# Patient Record
Sex: MALE | Race: WHITE | NOT HISPANIC OR LATINO | Employment: FULL TIME | URBAN - METROPOLITAN AREA
[De-identification: names, ages, dates, MRNs, and addresses within clinical notes are randomized per-mention and may not be internally consistent; named-entity substitution may affect disease eponyms.]

---

## 2018-05-05 ENCOUNTER — HOSPITAL ENCOUNTER (EMERGENCY)
Facility: HOSPITAL | Age: 63
Discharge: HOME | End: 2018-05-05
Attending: EMERGENCY MEDICINE
Payer: COMMERCIAL

## 2018-05-05 VITALS
HEIGHT: 70 IN | TEMPERATURE: 98 F | BODY MASS INDEX: 37.94 KG/M2 | WEIGHT: 265 LBS | RESPIRATION RATE: 24 BRPM | SYSTOLIC BLOOD PRESSURE: 140 MMHG | HEART RATE: 74 BPM | OXYGEN SATURATION: 95 % | DIASTOLIC BLOOD PRESSURE: 74 MMHG

## 2018-05-05 DIAGNOSIS — R06.00 DYSPNEA: ICD-10-CM

## 2018-05-05 DIAGNOSIS — R06.02 SOB (SHORTNESS OF BREATH): ICD-10-CM

## 2018-05-05 DIAGNOSIS — J45.901 ASTHMATIC BRONCHITIS WITH ACUTE EXACERBATION, UNSPECIFIED ASTHMA SEVERITY, UNSPECIFIED WHETHER PERSISTENT: Primary | ICD-10-CM

## 2018-05-05 LAB
ALBUMIN SERPL BCP-MCNC: 3.8 G/DL
ALP SERPL-CCNC: 38 U/L
ALT SERPL W/O P-5'-P-CCNC: 18 U/L
ANION GAP SERPL CALC-SCNC: 7 MMOL/L
AST SERPL-CCNC: 20 U/L
BASOPHILS # BLD AUTO: 0.04 K/UL
BASOPHILS NFR BLD: 0.4 %
BILIRUB SERPL-MCNC: 0.5 MG/DL
BNP SERPL-MCNC: 45 PG/ML
BUN SERPL-MCNC: 27 MG/DL
CALCIUM SERPL-MCNC: 9 MG/DL
CHLORIDE SERPL-SCNC: 109 MMOL/L
CO2 SERPL-SCNC: 23 MMOL/L
CREAT SERPL-MCNC: 1.2 MG/DL
D DIMER PPP IA.FEU-MCNC: 0.52 MG/L FEU
DIFFERENTIAL METHOD: ABNORMAL
EOSINOPHIL # BLD AUTO: 0.7 K/UL
EOSINOPHIL NFR BLD: 6.9 %
ERYTHROCYTE [DISTWIDTH] IN BLOOD BY AUTOMATED COUNT: 13.9 %
EST. GFR  (AFRICAN AMERICAN): >60 ML/MIN/1.73 M^2
EST. GFR  (NON AFRICAN AMERICAN): >60 ML/MIN/1.73 M^2
GLUCOSE SERPL-MCNC: 100 MG/DL
HCT VFR BLD AUTO: 44.1 %
HGB BLD-MCNC: 14.6 G/DL
LYMPHOCYTES # BLD AUTO: 1.5 K/UL
LYMPHOCYTES NFR BLD: 15.1 %
MCH RBC QN AUTO: 28.9 PG
MCHC RBC AUTO-ENTMCNC: 33.1 G/DL
MCV RBC AUTO: 87 FL
MONOCYTES # BLD AUTO: 1.3 K/UL
MONOCYTES NFR BLD: 12.9 %
NEUTROPHILS # BLD AUTO: 6.2 K/UL
NEUTROPHILS NFR BLD: 64.6 %
PLATELET # BLD AUTO: 225 K/UL
PMV BLD AUTO: 10.4 FL
POTASSIUM SERPL-SCNC: 4 MMOL/L
PROT SERPL-MCNC: 7.1 G/DL
RBC # BLD AUTO: 5.06 M/UL
SODIUM SERPL-SCNC: 139 MMOL/L
TROPONIN I SERPL DL<=0.01 NG/ML-MCNC: 0.01 NG/ML
WBC # BLD AUTO: 9.66 K/UL

## 2018-05-05 PROCEDURE — 94644 CONT INHLJ TX 1ST HOUR: CPT

## 2018-05-05 PROCEDURE — 80053 COMPREHEN METABOLIC PANEL: CPT

## 2018-05-05 PROCEDURE — 63600175 PHARM REV CODE 636 W HCPCS: Performed by: EMERGENCY MEDICINE

## 2018-05-05 PROCEDURE — 25000242 PHARM REV CODE 250 ALT 637 W/ HCPCS: Performed by: NURSE PRACTITIONER

## 2018-05-05 PROCEDURE — 93005 ELECTROCARDIOGRAM TRACING: CPT

## 2018-05-05 PROCEDURE — 84484 ASSAY OF TROPONIN QUANT: CPT

## 2018-05-05 PROCEDURE — 99285 EMERGENCY DEPT VISIT HI MDM: CPT | Mod: 25

## 2018-05-05 PROCEDURE — 93010 ELECTROCARDIOGRAM REPORT: CPT | Mod: ,,, | Performed by: INTERNAL MEDICINE

## 2018-05-05 PROCEDURE — 25500020 PHARM REV CODE 255: Performed by: EMERGENCY MEDICINE

## 2018-05-05 PROCEDURE — 83880 ASSAY OF NATRIURETIC PEPTIDE: CPT

## 2018-05-05 PROCEDURE — 85025 COMPLETE CBC W/AUTO DIFF WBC: CPT

## 2018-05-05 PROCEDURE — 85379 FIBRIN DEGRADATION QUANT: CPT

## 2018-05-05 RX ORDER — AZITHROMYCIN 250 MG/1
500 TABLET, FILM COATED ORAL DAILY
Qty: 6 TABLET | Refills: 0 | Status: SHIPPED | OUTPATIENT
Start: 2018-05-05

## 2018-05-05 RX ORDER — PREDNISONE 20 MG/1
40 TABLET ORAL
Status: COMPLETED | OUTPATIENT
Start: 2018-05-05 | End: 2018-05-05

## 2018-05-05 RX ORDER — CAPTOPRIL 100 MG/1
25 TABLET ORAL 2 TIMES DAILY
COMMUNITY

## 2018-05-05 RX ORDER — PREDNISONE 20 MG/1
40 TABLET ORAL DAILY
Qty: 10 TABLET | Refills: 0 | Status: SHIPPED | OUTPATIENT
Start: 2018-05-05 | End: 2018-05-10

## 2018-05-05 RX ORDER — ALBUTEROL SULFATE 2.5 MG/.5ML
15 SOLUTION RESPIRATORY (INHALATION)
Status: COMPLETED | OUTPATIENT
Start: 2018-05-05 | End: 2018-05-05

## 2018-05-05 RX ORDER — ALBUTEROL SULFATE 90 UG/1
1-2 AEROSOL, METERED RESPIRATORY (INHALATION) EVERY 6 HOURS PRN
Qty: 1 INHALER | Refills: 0 | Status: SHIPPED | OUTPATIENT
Start: 2018-05-05 | End: 2019-05-05

## 2018-05-05 RX ADMIN — PREDNISONE 40 MG: 20 TABLET ORAL at 12:05

## 2018-05-05 RX ADMIN — IOHEXOL 70 ML: 350 INJECTION, SOLUTION INTRAVENOUS at 11:05

## 2018-05-05 RX ADMIN — ALBUTEROL SULFATE 15 MG: 2.5 SOLUTION RESPIRATORY (INHALATION) at 10:05

## 2018-05-05 NOTE — ED TRIAGE NOTES
Pt arrived via personal transport with c/o sob and cough that began yesterday; pt denies cp, n/v at this time; pt states this has happened to him before and was given a breathing treatment

## 2018-05-05 NOTE — ED PROVIDER NOTES
Encounter Date: 5/5/2018  SOB since coughing episode this morning.  Patient in respiratory distress.  Occasional wheeze with breath sound.       NMT  CXR  ECG  SCRIBE #1 NOTE: I, Ankit Felder, am scribing for, and in the presence of, Joe Diaz MD. Other sections scribed: HPI, ROS, PE, MDM.       History     Chief Complaint   Patient presents with    Shortness of Breath     x 1 day. PMH of HTN. Denies chest pain     CC: Shortness of Breath  HPI: This 62 y.o. male smoker with Hx of HTN and chronic back pain presents to the ED c/o cough that developed yesterday afternoon that has since gradually worsened. Pt reports feeling SOB today. Pt explains that he is [place] the [place] from [place], and has spent the past few months [place] He uses a walker. He denies fever, chest pain, hemoptysis.      The history is provided by the patient.     Review of patient's allergies indicates:  No Known Allergies  Past Medical History:   Diagnosis Date    Hypertension      Past Surgical History:   Procedure Laterality Date    kidney stones      KNEE SURGERY       History reviewed. No pertinent family history.  Social History   Substance Use Topics    Smoking status: Current Every Day Smoker     Types: Cigarettes    Smokeless tobacco: Not on file    Alcohol use No     Review of Systems   Constitutional: Negative for chills and fever.   HENT: Negative for ear pain, rhinorrhea and sore throat.    Eyes: Negative for pain and visual disturbance.   Respiratory: Positive for cough, shortness of breath and wheezing.    Cardiovascular: Negative for chest pain and leg swelling.   Gastrointestinal: Negative for abdominal pain, nausea and vomiting.   Genitourinary: Negative for dysuria and frequency.   Musculoskeletal: Negative for arthralgias and myalgias.   Skin: Negative for rash.   Neurological: Negative for dizziness and headaches.       Physical Exam     Initial Vitals [05/05/18  0924]   BP Pulse Resp Temp SpO2   (!) 184/99 77 (!) 26 98.1 °F (36.7 °C) 95 %      MAP       127.33         Physical Exam    Nursing note and vitals reviewed.  Constitutional: He appears well-developed and well-nourished.   HENT:   Head: Normocephalic and atraumatic.   Cardiovascular: Normal rate and regular rhythm.   Equal radial, femoral and DP pulses.   Pulmonary/Chest: No respiratory distress. He has wheezes. He has no rhonchi. He has no rales. He exhibits no tenderness.   Moderately SOB w/ diffuse expiratory wheezes   Abdominal: Soft. There is no tenderness.   Musculoskeletal: Normal range of motion. He exhibits no tenderness.   Trace edema to bilateral lower extremities. No sign of DVT.   Neurological: He is alert.   Skin: Skin is warm.   Psychiatric: Thought content normal.         ED Course   Procedures  Labs Reviewed   CBC W/ AUTO DIFFERENTIAL - Abnormal; Notable for the following:        Result Value    Mono # 1.3 (*)     Eos # 0.7 (*)     Lymph% 15.1 (*)     All other components within normal limits   COMPREHENSIVE METABOLIC PANEL - Abnormal; Notable for the following:     BUN, Bld 27 (*)     Alkaline Phosphatase 38 (*)     Anion Gap 7 (*)     All other components within normal limits   D DIMER, QUANTITATIVE - Abnormal; Notable for the following:     D-Dimer 0.52 (*)     All other components within normal limits   TROPONIN I   B-TYPE NATRIURETIC PEPTIDE     EKG Readings: (Independently Interpreted)   NSR at85 with artifact   No acute ischemic changes          Medical Decision Making:   Initial Assessment:   Pt presents with wheezing and SOB. Will do cardiac work-up and give nebulizer treatment. Will rule PE due to significant travel Hx.  ED Management:  10:56 AM  Wheezing improved with nebs and breathing comfortably    12:27 PM  Pt responded well to treatment with nebs.  Doing well off oxygen and feeling back to normal except mild cough.  Reviewed studies.  Doubt subtle PE.  Has alternative diagnosis  confirmed with peribronchial cuiffing.  Will dischagre home on MDI, prednisone, zpak.  Doubt ACS - will not repeat a troponin.            Scribe Attestation:   Scribe #1: I performed the above scribed service and the documentation accurately describes the services I performed. I attest to the accuracy of the note.    Attending Attestation:           Physician Attestation for Scribe:  Physician Attestation Statement for Scribe #1: I, Joe Diaz MD, reviewed documentation, as scribed by Ankit Felder in my presence, and it is both accurate and complete.                    Clinical Impression:   The primary encounter diagnosis was Asthmatic bronchitis with acute exacerbation, unspecified asthma severity, unspecified whether persistent. Diagnoses of SOB (shortness of breath) and Dyspnea were also pertinent to this visit.    Disposition:   Disposition: Discharged  Condition: Stable                        Joe Diaz MD  05/05/18 9525

## 2019-04-26 ENCOUNTER — HOSPITAL ENCOUNTER (EMERGENCY)
Facility: HOSPITAL | Age: 64
Discharge: HOME | End: 2019-04-26
Attending: EMERGENCY MEDICINE
Payer: COMMERCIAL

## 2019-04-26 VITALS
SYSTOLIC BLOOD PRESSURE: 180 MMHG | DIASTOLIC BLOOD PRESSURE: 98 MMHG | HEART RATE: 60 BPM | WEIGHT: 270 LBS | TEMPERATURE: 98 F | OXYGEN SATURATION: 99 % | HEIGHT: 70 IN | BODY MASS INDEX: 38.65 KG/M2 | RESPIRATION RATE: 18 BRPM

## 2019-04-26 DIAGNOSIS — M54.6 ACUTE RIGHT-SIDED THORACIC BACK PAIN: Primary | ICD-10-CM

## 2019-04-26 DIAGNOSIS — R07.9 RIGHT-SIDED CHEST PAIN: ICD-10-CM

## 2019-04-26 DIAGNOSIS — R60.9 EDEMA: ICD-10-CM

## 2019-04-26 LAB
ALBUMIN SERPL BCP-MCNC: 3.5 G/DL (ref 3.5–5.2)
ALP SERPL-CCNC: 34 U/L (ref 55–135)
ALT SERPL W/O P-5'-P-CCNC: 10 U/L (ref 10–44)
ANION GAP SERPL CALC-SCNC: 7 MMOL/L (ref 8–16)
AST SERPL-CCNC: 15 U/L (ref 10–40)
BASOPHILS # BLD AUTO: 0.07 K/UL (ref 0–0.2)
BASOPHILS NFR BLD: 0.7 % (ref 0–1.9)
BILIRUB SERPL-MCNC: 0.2 MG/DL (ref 0.1–1)
BILIRUB UR QL STRIP: NEGATIVE
BUN SERPL-MCNC: 23 MG/DL (ref 8–23)
CALCIUM SERPL-MCNC: 9.3 MG/DL (ref 8.7–10.5)
CHLORIDE SERPL-SCNC: 109 MMOL/L (ref 95–110)
CLARITY UR: CLEAR
CO2 SERPL-SCNC: 22 MMOL/L (ref 23–29)
COLOR UR: YELLOW
CREAT SERPL-MCNC: 1.2 MG/DL (ref 0.5–1.4)
D DIMER PPP IA.FEU-MCNC: 0.52 MG/L FEU
DIFFERENTIAL METHOD: ABNORMAL
EOSINOPHIL # BLD AUTO: 0.7 K/UL (ref 0–0.5)
EOSINOPHIL NFR BLD: 6.7 % (ref 0–8)
ERYTHROCYTE [DISTWIDTH] IN BLOOD BY AUTOMATED COUNT: 13.4 % (ref 11.5–14.5)
EST. GFR  (AFRICAN AMERICAN): >60 ML/MIN/1.73 M^2
EST. GFR  (NON AFRICAN AMERICAN): >60 ML/MIN/1.73 M^2
GLUCOSE SERPL-MCNC: 106 MG/DL (ref 70–110)
GLUCOSE UR QL STRIP: NEGATIVE
HCT VFR BLD AUTO: 42.7 % (ref 40–54)
HGB BLD-MCNC: 13.5 G/DL (ref 14–18)
HGB UR QL STRIP: NEGATIVE
KETONES UR QL STRIP: NEGATIVE
LEUKOCYTE ESTERASE UR QL STRIP: NEGATIVE
LIPASE SERPL-CCNC: 12 U/L (ref 4–60)
LYMPHOCYTES # BLD AUTO: 3.3 K/UL (ref 1–4.8)
LYMPHOCYTES NFR BLD: 34.3 % (ref 18–48)
MCH RBC QN AUTO: 28.8 PG (ref 27–31)
MCHC RBC AUTO-ENTMCNC: 31.6 G/DL (ref 32–36)
MCV RBC AUTO: 91 FL (ref 82–98)
MICROSCOPIC COMMENT: NORMAL
MONOCYTES # BLD AUTO: 0.8 K/UL (ref 0.3–1)
MONOCYTES NFR BLD: 8.7 % (ref 4–15)
NEUTROPHILS # BLD AUTO: 4.8 K/UL (ref 1.8–7.7)
NEUTROPHILS NFR BLD: 49.6 % (ref 38–73)
NITRITE UR QL STRIP: NEGATIVE
PH UR STRIP: 5 [PH] (ref 5–8)
PLATELET # BLD AUTO: 247 K/UL (ref 150–350)
PMV BLD AUTO: 10 FL (ref 9.2–12.9)
POTASSIUM SERPL-SCNC: 4.2 MMOL/L (ref 3.5–5.1)
PROT SERPL-MCNC: 6.8 G/DL (ref 6–8.4)
PROT UR QL STRIP: NEGATIVE
RBC # BLD AUTO: 4.69 M/UL (ref 4.6–6.2)
RBC #/AREA URNS HPF: 1 /HPF (ref 0–4)
SODIUM SERPL-SCNC: 138 MMOL/L (ref 136–145)
SP GR UR STRIP: 1.02 (ref 1–1.03)
URN SPEC COLLECT METH UR: NORMAL
UROBILINOGEN UR STRIP-ACNC: NEGATIVE EU/DL
WBC # BLD AUTO: 9.67 K/UL (ref 3.9–12.7)

## 2019-04-26 PROCEDURE — 63600175 PHARM REV CODE 636 W HCPCS: Performed by: EMERGENCY MEDICINE

## 2019-04-26 PROCEDURE — 96372 THER/PROPH/DIAG INJ SC/IM: CPT

## 2019-04-26 PROCEDURE — 93010 ELECTROCARDIOGRAM REPORT: CPT | Mod: ,,, | Performed by: INTERNAL MEDICINE

## 2019-04-26 PROCEDURE — 85379 FIBRIN DEGRADATION QUANT: CPT

## 2019-04-26 PROCEDURE — 83690 ASSAY OF LIPASE: CPT

## 2019-04-26 PROCEDURE — 93005 ELECTROCARDIOGRAM TRACING: CPT

## 2019-04-26 PROCEDURE — 80053 COMPREHEN METABOLIC PANEL: CPT

## 2019-04-26 PROCEDURE — 93010 EKG 12-LEAD: ICD-10-PCS | Mod: ,,, | Performed by: INTERNAL MEDICINE

## 2019-04-26 PROCEDURE — 85025 COMPLETE CBC W/AUTO DIFF WBC: CPT

## 2019-04-26 PROCEDURE — 99284 EMERGENCY DEPT VISIT MOD MDM: CPT | Mod: 25

## 2019-04-26 PROCEDURE — 81000 URINALYSIS NONAUTO W/SCOPE: CPT

## 2019-04-26 RX ORDER — PERINDOPRIL ERBUMINE 4 MG/1
4 TABLET ORAL DAILY
COMMUNITY

## 2019-04-26 RX ORDER — DEXAMETHASONE SODIUM PHOSPHATE 4 MG/ML
12 INJECTION, SOLUTION INTRA-ARTICULAR; INTRALESIONAL; INTRAMUSCULAR; INTRAVENOUS; SOFT TISSUE
Status: COMPLETED | OUTPATIENT
Start: 2019-04-26 | End: 2019-04-26

## 2019-04-26 RX ORDER — PREDNISONE 20 MG/1
60 TABLET ORAL DAILY
Qty: 15 TABLET | Refills: 0 | Status: SHIPPED | OUTPATIENT
Start: 2019-04-26 | End: 2019-05-01

## 2019-04-26 RX ADMIN — DEXAMETHASONE SODIUM PHOSPHATE 12 MG: 4 INJECTION, SOLUTION INTRA-ARTICULAR; INTRALESIONAL; INTRAMUSCULAR; INTRAVENOUS; SOFT TISSUE at 07:04

## 2019-04-26 NOTE — ED PROVIDER NOTES
Encounter Date: 4/26/2019       History     Chief Complaint   Patient presents with    Abdominal Pain     Pt c/o right sided abdominal pain radiating to back on and off for about a month. Pt reports living in [place] and was seen in ED there with no diagnosis. Pt reports the pain has been increasing the last few days. Denies nausea or vomting.      HPI   This 63-year-old male presents to the emergency room complaining of some right low thoracic back pain that radiates around to the right low anterior chest off and on for about a month.  The patient has been studied in [place].  He had CTs there that were negative.  Pulmonary embolus is been considered.  The patient is at [facility] for [facility] emboli in the past.  This evaluation is been negative.  The patient denies nausea vomiting or diarrhea.  There is no painful urination dark urine blood in the stool.  The patient has no history of trauma he is otherwise well. His pain is definitely only on the right side.  Review of patient's allergies indicates:  No Known Allergies  Past Medical History:   Diagnosis Date    Hypertension      Past Surgical History:   Procedure Laterality Date    kidney stones      KNEE SURGERY       No family history on file.  Social History     Tobacco Use    Smoking status: Current Every Day Smoker     Types: Cigarettes   Substance Use Topics    Alcohol use: No    Drug use: No     Review of Systems  The patient was questioned specifically with regard to the following.  General: Fever, chills, sweats. Neuro: Headache. Eyes: eye problems. ENT: Ear pain, sore throat. Cardiovascular: Chest pain. Respiratory: Cough, shortness of breath. Gastrointestinal: Abdominal pain, vomiting, diarrhea. Genitourinary: Painful urination.  Musculoskeletal: Arm and leg problems. Skin: Rash.  The review of systems was negative except for the following:  Right low thoracic back, right anterior chest pain.  There is no real abdominal pain. There is no nausea  vomiting.  Physical Exam     Initial Vitals [04/26/19 0406]   BP Pulse Resp Temp SpO2   (!) 216/122 66 18 97.7 °F (36.5 °C) 98 %      MAP       --         Physical Exam  The patient was examined specifically for the following:   General:No significant distress, Good color, Warm and dry. Head and neck:Scalp atraumatic, Neck supple. Neurological:Appropriate conversation, Gross motor deficits. Eyes:Conjugate gaze, Clear corneas. ENT: No epistaxis. Cardiac: Regular rate and rhythm, Grossly normal heart tones. Pulmonary: Wheezing, Rales. Gastrointestinal: Abdominal tenderness, Abdominal distention. Musculoskeletal: Extremity deformity, Apparent pain with range of motion of the joints. Skin: Rash.   The findings on examination were normal.  The lungs are clear.  The heart tones are normal.  The abdomen is soft.  There is no chest tenderness thoracic back tenderness midline back tenderness. The abdomen is completely nontender.  There is no costovertebral angle tenderness.  ED Course   Procedures  Labs Reviewed   CBC W/ AUTO DIFFERENTIAL - Abnormal; Notable for the following components:       Result Value    Hemoglobin 13.5 (*)     MCHC 31.6 (*)     Eos # 0.7 (*)     All other components within normal limits   COMPREHENSIVE METABOLIC PANEL - Abnormal; Notable for the following components:    CO2 22 (*)     Alkaline Phosphatase 34 (*)     Anion Gap 7 (*)     All other components within normal limits   D DIMER, QUANTITATIVE - Abnormal; Notable for the following components:    D-Dimer 0.52 (*)     All other components within normal limits   LIPASE   URINALYSIS, REFLEX TO URINE CULTURE    Narrative:     Preferred Collection Type->Urine, Clean Catch   URINALYSIS MICROSCOPIC    Narrative:     Preferred Collection Type->Urine, Clean Catch        ECG Results          EKG 12-lead (In process)  Result time 04/26/19 05:56:46    In process by Interface, Lab In [facility] (04/26/19 05:56:46)                 Narrative:    Test Reason :  R60.9,    Vent. Rate : 058 BPM     Atrial Rate : 058 BPM     P-R Int : 224 ms          QRS Dur : 130 ms      QT Int : 408 ms       P-R-T Axes : 072 -78 049 degrees     QTc Int : 400 ms    Sinus bradycardia with 1st degree A-V block  Left axis deviation  Nonspecific intraventricular block  Abnormal ECG  When compared with ECG of 05-MAY-2018 09:53,  Significant changes have occurred    Referred By: [email or web address]   SELF           Confirmed By:                   In process by Interface, Lab In [facility] (04/26/19 05:54:06)                 Narrative:    Test Reason : R60.9,    Vent. Rate : 058 BPM     Atrial Rate : 058 BPM     P-R Int : 224 ms          QRS Dur : 130 ms      QT Int : 408 ms       P-R-T Axes : 072 -78 049 degrees     QTc Int : 400 ms    Sinus bradycardia with 1st degree A-V block  Left axis deviation  Nonspecific intraventricular block  Abnormal ECG  When compared with ECG of 05-MAY-2018 09:53,  Significant changes have occurred    Referred By: [email or web address]   SELF           Confirmed By:                             Imaging Results          CT Renal Stone Study ABD Pelvis WO (Final result)  Result time 04/26/19 07:05:22    Final result by Ar Lange MD (04/26/19 07:05:22)                 Impression:      1.  Bilateral nonobstructing renal calculi without evidence of hydronephrosis.  No definite ureteral calculus identified.    2.  Hepatomegaly with stable appendix cyst.    3.  Bilateral low-attenuation adrenal nodules suggestive of adrenal adenomas.    4.  Right renal cyst.    5.  Diverticulosis without evidence of acute diverticulitis.      Electronically signed by: Ar Lange MD  Date:    04/26/2019  Time:    07:05             Narrative:    EXAMINATION:  CT RENAL STONE STUDY ABD PELVIS WO    CLINICAL HISTORY:  Flank pain, stone disease suspected;    TECHNIQUE:  Low dose axial images, sagittal and coronal reformations were obtained from the lung bases to the pubic symphysis.  Contrast was not  administered.    COMPARISON:  None    FINDINGS:  The lung bases are unremarkable.  There is no pleural fluid present.  There are coronary artery calcifications.  No significant pericardial effusion.    The liver is mildly enlarged.  There is a stable 2.9 cm well-circumscribed left hepatic hypodensity consistent with a cyst.  No additional focal hepatic abnormality identified on this limited noncontrast examination.  The gallbladder shows no evidence of stones or pericholecystic fluid.  There is no intra-or extrahepatic biliary ductal dilatation.    The stomach, pancreas, and spleen appear within normal limits.  There is a 2.3 cm low-attenuation left adrenal nodule demonstrating imaging characteristics most consistent with an adenoma.  There is an additional low-attenuation 0.6 cm right adrenal nodule which is difficult to characterize given small size but also likely represents an adenoma.    The kidneys demonstrate no evidence of hydronephrosis.  There are bilateral nonobstructing renal calculi.  No definite calculus identified within either ureter, noting the distal ureters are not well visualized.  There is a right lower pole renal cyst.  The urinary bladder appears within normal limits.  The prostate is unremarkable.  There are bilateral fat containing inguinal hernias.    The abdominal aorta is normal in course and caliber without significant atherosclerotic calcifications.The visualized loops of small and large bowel show no evidence of obstruction or inflammation.  There are scattered colonic diverticula without evidence of acute diverticulitis.  The appendix appears within normal limits.  There is no ascites or free intraperitoneal air.    The osseous structures demonstrate degenerative changes of the spine.  The extraperitoneal soft tissues are unremarkable.                              Medical decision making:  Given the above, this patient has thoracic back and right chest pain. The patient has a slightly  elevated D-dimer.  With age adjustment this is normal for his age. He has no shortness of breath or tachycardia.  He was hypertensive on arrival but is not hypertensive now.  There is no rash. His unclear to me the cause of this pain.  There is no right upper quadrant tenderness. I doubt cholecystitis.  I doubt cholelithiasis and biliary colic.  There is no radiation to the right lower quadrant.  I doubt ureteral colic.  Musculoskeletal pain is considered but the pain is not particularly positional or worse with movement.  The patient had a CT scan was essentially negative today.  He has had negative CTs before.  I offered him ultrasound.  He declined.  I think gallbladder disease is unlikely.  I will try treating him with steroids.  Ibuprofen and Toradol have helped.  The patient's symptoms recur.                       ED Course as of Apr 26 0725 Fri Apr 26, 2019   0530 My independent interpretation of the EKG is normal sinus rhythm with poor R-wave progression and left axis deviation.  There is an intraventricular block.  This EKG is unchanged from previous EKG.   EKG 12-lead []      ED Course User Index  [MH] Annie Forde MD     Clinical Impression:       ICD-10-CM ICD-9-CM   1. Acute right-sided thoracic back pain M54.6 724.1   2. Edema R60.9 782.3   3. Right-sided chest pain R07.9 786.50                                Javed Tamayo MD  04/26/19 2593

## 2019-04-26 NOTE — DISCHARGE INSTRUCTIONS
Please return immediately if you get worse or if new problems develop.  Prednisone as directed.  You may use a heating pad.  Please follow-up with a primary care doctor this week.

## 2019-04-26 NOTE — ED PROVIDER NOTES
EM PHYSICIAN NOTE    HPI  This patient presents with a complaint of   Chief Complaint   Patient presents with    Abdominal Pain     Pt c/o right sided abdominal pain radiating to back on and off for about a month. Pt reports living in [place] and was seen in ED there with no diagnosis. Pt reports the pain has been increasing the last few days. Denies nausea or vomting.        HPI:  This is a 63-year-old gentleman who presents the emergency department with a complaint of right-sided abdominal pain radiating to his intermittently for the past month.  Patient reports that he feels like he cannot get in a comfortable position.  Does not feel like any positions worsened the pain but sometimes standing up helps the pain.  Patient reports pain is sharp and is not responding to Excedrin.  Patient also endorses lower extremity edema which has been worse over past few days.  There has been no chest pain or shortness of breath.  There has been no nausea vomiting. Patient has a history of hypertension but reports compliance with his medicines.  There is no history trauma. There has been no change in his urination or stool.    REVIEW of PMH, SOC History and Family History:  Past Medical History:   Diagnosis Date    Hypertension      Past Surgical History:   Procedure Laterality Date    kidney stones      KNEE SURGERY       Social History     Socioeconomic History    Marital status: Legally      Spouse name: Not on file    Number of children: Not on file    Years of education: Not on file    Highest education level: Not on file   Occupational History    Not on file   Social Needs    Financial resource strain: Not on file    Food insecurity:     Worry: Not on file     Inability: Not on file    Transportation needs:     Medical: Not on file     Non-medical: Not on file   Tobacco Use    Smoking status: Current Every Day Smoker     Types: Cigarettes   Substance and Sexual Activity    Alcohol use: No    Drug  use: No    Sexual activity: Not Currently   Lifestyle    Physical activity:     Days per week: Not on file     Minutes per session: Not on file    Stress: Not on file   Relationships    Social connections:     Talks on phone: Not on file     Gets together: Not on file     Attends Worship service: Not on file     Active member of club or organization: Not on file     Attends meetings of clubs or organizations: Not on file     Relationship status: Not on file   Other Topics Concern    Not on file   Social History Narrative    Not on file     Patient Active Problem List   Diagnosis    Asthmatic bronchitis with acute exacerbation     No current facility-administered medications for this encounter.      Current Outpatient Medications   Medication Sig Dispense Refill    albuterol 90 mcg/actuation inhaler Inhale 1-2 puffs into the lungs every 6 (six) hours as needed for Wheezing. Rescue 1 Inhaler 0    azithromycin (ZITHROMAX Z-CHADD) 250 MG tablet Take 2 tablets (500 mg total) by mouth once daily. 6 tablet 0    captopril (CAPOTEN) 100 MG Tab Take 25 mg by mouth 2 (two) times daily.       Review of patient's allergies indicates:  No Known Allergies    There is no immunization history on file for this patient.  No family history on file.    REVIEW of SYSTEMS  Source:  Patient  The nurse's notes and triage vital signs were reviewed.  GENERAL/CONSTITUTIONAL: There is no report of fever, fatigue, weakness, or unexplained weight loss.  CARDIOVASCULAR: There is no report of chest pain   RESPIRATORY: There is no report of cough or SOB  GASTROINTESTINAL: There is no report of nausea, vomiting, diarrhea  MUSCULOSKELETAL: The patient has a history of chronic back pain and uses a walker as needed. No muscle weakness or tenderness.  SKIN AND BREASTS: There is no report of easy bruising, skin redness, skin rash.  HEMATOLOGIC/LYMPHATIC: There is no report of anemia, bleeding or clotting defects. There is no report of  "anticoagulant use.  The remainder of the ROS is negative.    PHYSICAL EXAMINATION    ED Triage Vitals [04/26/19 0406]   Enc Vitals Group      BP (!) 216/122      Pulse 66      Resp 18      Temp 97.7 °F (36.5 °C)      Temp src Oral      SpO2 98 %      Weight 270 lb      Height 5' 10"      Head Circumference       Peak Flow       Pain Score       Pain Loc       Pain Edu?       Excl. in GC?      Vital signs and Pulse Ox reviewed in clinical context. Abnormalities noted: Hypertension noted and patient will be referred to primary care physician for close follow-up  Pt's level of consciousness is alert, and the patient is in moderate distress.  Skin: warm, pink and dry  Mucosa:moist  Head and Neck: WNL  Cardiac exam: RRR  Pulmonary exam: unlabored and clear  Abd Exam: soft nontender; there is no Hay's or McBurney point tenderness  Musculoskeletal:  There is bilateral lower extremity edema left greater than right  Neurologic: GCS 15. 5 over 5 strength, cranial nerves intact, neck supple     Medical decision making: Nursing notes reviewed and incorporated  Impression:  Right flank pain with lower extremity edema and hypertension  Plan:  Labs, imaging, D-dimer, analgesics, reassess  Annie Forde MD, 4:44 AM 4/26/2019    This note was created using Dictation Software.  This program may occasionally misinterpret certain words and phrases.          The following clinical decision rules were used in the management of this patient:  PERC  Age over 49, Unilateral leg swelling, PERC: LOW suspicion AND any criteria present = check dimer to determine CTA or VQ and D-dimer age adjusted: age >50 X 10 (only useful if low risk patient)           Annie Forde MD  04/26/19 6528       Annie Forde MD  04/26/19 0510    "

## 2019-04-26 NOTE — ED NOTES
Past Medical History:   Diagnosis Date    Hypertension      Pt presenting with co right side back and r upper abdominal pain.  Pt states pain started 1 month ago but has gotten worst yesterday. Pt reports he is visiting from [place] and the pain in preventing him from sleeping. He went to a hospital on 4/4/2019 in [place], and was given a prescription for Toradol in which he took a dose @2300.  He has taken Excedrin x 4 since yesterday with the last dose being @ 0200.